# Patient Record
Sex: FEMALE | Race: WHITE | NOT HISPANIC OR LATINO | URBAN - METROPOLITAN AREA
[De-identification: names, ages, dates, MRNs, and addresses within clinical notes are randomized per-mention and may not be internally consistent; named-entity substitution may affect disease eponyms.]

---

## 2024-02-02 ENCOUNTER — LAB (OUTPATIENT)
Dept: LAB | Facility: LAB | Age: 34
End: 2024-02-02
Payer: COMMERCIAL

## 2024-02-02 ENCOUNTER — OFFICE VISIT (OUTPATIENT)
Dept: PRIMARY CARE | Facility: CLINIC | Age: 34
End: 2024-02-02
Payer: COMMERCIAL

## 2024-02-02 VITALS
HEIGHT: 62 IN | SYSTOLIC BLOOD PRESSURE: 110 MMHG | BODY MASS INDEX: 21.53 KG/M2 | WEIGHT: 117 LBS | HEART RATE: 91 BPM | DIASTOLIC BLOOD PRESSURE: 78 MMHG

## 2024-02-02 DIAGNOSIS — K59.09 CHRONIC CONSTIPATION: ICD-10-CM

## 2024-02-02 DIAGNOSIS — Z11.59 ENCOUNTER FOR HEPATITIS C SCREENING TEST FOR LOW RISK PATIENT: ICD-10-CM

## 2024-02-02 DIAGNOSIS — Z00.00 ENCOUNTER FOR PREVENTIVE HEALTH EXAMINATION: ICD-10-CM

## 2024-02-02 DIAGNOSIS — R30.0 DYSURIA: ICD-10-CM

## 2024-02-02 DIAGNOSIS — Z00.00 ENCOUNTER FOR PREVENTIVE HEALTH EXAMINATION: Primary | ICD-10-CM

## 2024-02-02 LAB
25(OH)D3 SERPL-MCNC: 21 NG/ML (ref 30–100)
ALBUMIN SERPL BCP-MCNC: 4.7 G/DL (ref 3.4–5)
ALP SERPL-CCNC: 40 U/L (ref 33–110)
ALT SERPL W P-5'-P-CCNC: 15 U/L (ref 7–45)
ANION GAP SERPL CALC-SCNC: 14 MMOL/L (ref 10–20)
AST SERPL W P-5'-P-CCNC: 18 U/L (ref 9–39)
BILIRUB SERPL-MCNC: 1.9 MG/DL (ref 0–1.2)
BUN SERPL-MCNC: 13 MG/DL (ref 6–23)
CALCIUM SERPL-MCNC: 9.1 MG/DL (ref 8.6–10.3)
CHLORIDE SERPL-SCNC: 103 MMOL/L (ref 98–107)
CHOLEST SERPL-MCNC: 192 MG/DL (ref 0–199)
CHOLESTEROL/HDL RATIO: 1.8
CO2 SERPL-SCNC: 26 MMOL/L (ref 21–32)
CREAT SERPL-MCNC: 0.58 MG/DL (ref 0.5–1.05)
EGFRCR SERPLBLD CKD-EPI 2021: >90 ML/MIN/1.73M*2
ERYTHROCYTE [DISTWIDTH] IN BLOOD BY AUTOMATED COUNT: 11.9 % (ref 11.5–14.5)
GLUCOSE SERPL-MCNC: 68 MG/DL (ref 74–99)
HCT VFR BLD AUTO: 45.1 % (ref 36–46)
HDLC SERPL-MCNC: 108 MG/DL
HGB BLD-MCNC: 14.7 G/DL (ref 12–16)
LDLC SERPL CALC-MCNC: 75 MG/DL
MCH RBC QN AUTO: 32.6 PG (ref 26–34)
MCHC RBC AUTO-ENTMCNC: 32.6 G/DL (ref 32–36)
MCV RBC AUTO: 100 FL (ref 80–100)
NON HDL CHOLESTEROL: 84 MG/DL (ref 0–149)
NRBC BLD-RTO: 0 /100 WBCS (ref 0–0)
PLATELET # BLD AUTO: 300 X10*3/UL (ref 150–450)
POC APPEARANCE, URINE: CLEAR
POC BILIRUBIN, URINE: NEGATIVE
POC BLOOD, URINE: NEGATIVE
POC COLOR, URINE: YELLOW
POC GLUCOSE, URINE: NEGATIVE MG/DL
POC KETONES, URINE: NEGATIVE MG/DL
POC LEUKOCYTES, URINE: NEGATIVE
POC NITRITE,URINE: NEGATIVE
POC PH, URINE: 6 PH
POC PROTEIN, URINE: NEGATIVE MG/DL
POC SPECIFIC GRAVITY, URINE: 1.01
POC UROBILINOGEN, URINE: 1 EU/DL
POTASSIUM SERPL-SCNC: 4.7 MMOL/L (ref 3.5–5.3)
PROT SERPL-MCNC: 7.3 G/DL (ref 6.4–8.2)
RBC # BLD AUTO: 4.51 X10*6/UL (ref 4–5.2)
SODIUM SERPL-SCNC: 138 MMOL/L (ref 136–145)
TRIGL SERPL-MCNC: 43 MG/DL (ref 0–149)
TSH SERPL-ACNC: 1.37 MIU/L (ref 0.44–3.98)
VLDL: 9 MG/DL (ref 0–40)
WBC # BLD AUTO: 8.1 X10*3/UL (ref 4.4–11.3)

## 2024-02-02 PROCEDURE — 80053 COMPREHEN METABOLIC PANEL: CPT

## 2024-02-02 PROCEDURE — 82306 VITAMIN D 25 HYDROXY: CPT

## 2024-02-02 PROCEDURE — 80061 LIPID PANEL: CPT

## 2024-02-02 PROCEDURE — 84443 ASSAY THYROID STIM HORMONE: CPT

## 2024-02-02 PROCEDURE — 86803 HEPATITIS C AB TEST: CPT

## 2024-02-02 PROCEDURE — 83036 HEMOGLOBIN GLYCOSYLATED A1C: CPT

## 2024-02-02 PROCEDURE — 99385 PREV VISIT NEW AGE 18-39: CPT | Performed by: STUDENT IN AN ORGANIZED HEALTH CARE EDUCATION/TRAINING PROGRAM

## 2024-02-02 PROCEDURE — 85027 COMPLETE CBC AUTOMATED: CPT

## 2024-02-02 PROCEDURE — 1036F TOBACCO NON-USER: CPT | Performed by: STUDENT IN AN ORGANIZED HEALTH CARE EDUCATION/TRAINING PROGRAM

## 2024-02-02 PROCEDURE — 81002 URINALYSIS NONAUTO W/O SCOPE: CPT | Performed by: STUDENT IN AN ORGANIZED HEALTH CARE EDUCATION/TRAINING PROGRAM

## 2024-02-02 PROCEDURE — 99204 OFFICE O/P NEW MOD 45 MIN: CPT | Performed by: STUDENT IN AN ORGANIZED HEALTH CARE EDUCATION/TRAINING PROGRAM

## 2024-02-02 PROCEDURE — 36415 COLL VENOUS BLD VENIPUNCTURE: CPT

## 2024-02-02 ASSESSMENT — PATIENT HEALTH QUESTIONNAIRE - PHQ9
1. LITTLE INTEREST OR PLEASURE IN DOING THINGS: NOT AT ALL
SUM OF ALL RESPONSES TO PHQ9 QUESTIONS 1 AND 2: 0
2. FEELING DOWN, DEPRESSED OR HOPELESS: NOT AT ALL

## 2024-02-02 NOTE — ASSESSMENT & PLAN NOTE
In office urinalysis was unremarkable, we will go ahead and send for culture  Increase daily water intake  You can try OTC remedies such as Azo

## 2024-02-02 NOTE — ASSESSMENT & PLAN NOTE
Continue daily Dulcolax  Increase daily water intake, should be getting close to half of your body weight in ounces  Order placed for GI referral

## 2024-02-02 NOTE — PROGRESS NOTES
"Subjective   Patient ID: Laina Elizabeth is a 33 y.o. female who presents for Annual Exam.              HPI  1.  Physical exam.  New patient here to establish care  Due for Pap, requesting gynecology referral  Her mom is 53 and recently diagnosed with ovarian cancer  Due for Tdap, declines  Fasting for lab work    2.  Chronic constipation  Reports 12-year history of constipation, takes daily Dulcolax  Drinks about 3 of the 16 ounce bottles of water daily  Had 1 episode of bright red blood in the toilet bowl after bowel movement about a year ago  Denies fever, abdominal pain or N/V/D  Requesting GI referral    3.  Dysuria  Developed UTI symptoms about 3 weeks ago which resolved after antibiotic therapy prescribed by an online provider  Was symptom-free for several days but has had recurrence of burning with urination for the past week  Denies all other symptoms    Review of Systems  All pertinent positive symptoms are included in the history of present illness.    All other systems have been reviewed and are negative and noncontributory to this patient's current ailments.    Past Medical History:   Diagnosis Date    Encounter for other preprocedural examination 03/05/2018    Pre-operative clearance    Hematuria, unspecified 03/06/2018    Blood in urine    Unspecified jaundice 03/06/2018    Elevated bilirubin     No past surgical history on file.  Social History     Tobacco Use    Smoking status: Never    Smokeless tobacco: Never     No family history on file.  Immunization History   Administered Date(s) Administered    [employer] SARS-CoV-2 Vaccination 05/20/2021     No current outpatient medications  Allergies   Allergen Reactions    Sulfamethoxazole-Trimethoprim Hives       Objective   Vitals:    02/02/24 1041   BP: 110/78   Pulse: 91   Weight: 53.1 kg (117 lb)   Height: 1.575 m (5' 2\")     Body mass index is 21.4 kg/m².    BP Readings from Last 3 Encounters:   02/02/24 110/78      Wt Readings from Last 3 Encounters: "   02/02/24 53.1 kg (117 lb)        No visits with results within 1 Month(s) from this visit.   Latest known visit with results is:   Legacy Encounter on 03/05/2018   Component Date Value    HCG, Urine 03/05/2018 NEGATIVE     Protime 03/05/2018 12.7     INR 03/05/2018 1.1     aPTT 03/05/2018 31     WBC 03/05/2018 6.6     nRBC 03/05/2018 0.0     RBC 03/05/2018 4.22     Hemoglobin 03/05/2018 13.4     Hematocrit 03/05/2018 40.2     MCV 03/05/2018 95     MCHC 03/05/2018 33.3     Platelets 03/05/2018 151     RDW 03/05/2018 11.7     Neutrophils % 03/05/2018 59.6     Immature Granulocytes %,* 03/05/2018 0.2     Lymphocytes % 03/05/2018 32.2     Monocytes % 03/05/2018 6.3     Eosinophils % 03/05/2018 1.2     Basophils % 03/05/2018 0.5     Neutrophils Absolute 03/05/2018 3.92     Lymphocytes Absolute 03/05/2018 2.11     Monocytes Absolute 03/05/2018 0.41     Eosinophils Absolute 03/05/2018 0.08     Basophils Absolute 03/05/2018 0.03     WBC, Urine 03/05/2018 2     RBC, Urine 03/05/2018 1     Squamous Epithelial Cell* 03/05/2018 2     Glucose 03/05/2018 89     Sodium 03/05/2018 140     Potassium 03/05/2018 4.0     Chloride 03/05/2018 108 (H)     Bicarbonate 03/05/2018 25     Anion Gap 03/05/2018 11     Urea Nitrogen 03/05/2018 13     Creatinine 03/05/2018 0.52     GLOMERULAR FILTRATION RA* 03/05/2018 >60     GLOMERULAR FILTRATION RA* 03/05/2018 >60     Calcium 03/05/2018 9.1     Albumin 03/05/2018 4.3     Alkaline Phosphatase 03/05/2018 37     Total Protein 03/05/2018 6.7     AST 03/05/2018 13     Total Bilirubin 03/05/2018 1.8 (H)     ALT (SGPT) 03/05/2018 11     Color, Urine 03/05/2018 YELLOW     Appearance, Urine 03/05/2018 CLEAR     Specific Gravity, Urine 03/05/2018 1.020     pH, Urine 03/05/2018 5.0     Protein, Urine 03/05/2018 NEGATIVE     Glucose, Urine 03/05/2018 NEGATIVE     Blood, Urine 03/05/2018 MODERATE (2+) (A)     Ketones, Urine 03/05/2018 NEGATIVE     Bilirubin, Urine 03/05/2018 NEGATIVE     Urobilinogen,  Urine 03/05/2018 <2.0     Nitrite, Urine 03/05/2018 NEGATIVE     Leukocyte Esterase, Urine 03/05/2018 NEGATIVE      Physical Exam  CONSTITUTIONAL - well nourished, well developed, looks like stated age, in no acute distress, not ill-appearing, and not tired appearing  SKIN - normal skin color and pigmentation, normal skin turgor without rash, lesions, or nodules visualized  HEAD - no trauma, normocephalic  CHEST - clear to auscultation, no wheezing, no crackles and no rales, good effort  CARDIAC - regular rate and regular rhythm, no skipped beats, no murmur  EXTREMITIES - no edema, no deformities  NEUROLOGICAL - normal gait, normal balance, normal motor  PSYCHIATRIC - alert, pleasant and cordial, age-appropriate     Assessment/Plan   Problem List Items Addressed This Visit       Encounter for preventive health examination - Primary     Complete history and physical examination was performed  Order for gynecology referral placed to have Pap updated  Tdap declined  We will notify of test results once available and make treatment recommendations accordingly         Relevant Orders    CBC    Comprehensive Metabolic Panel    Hemoglobin A1C    Lipid Panel    TSH with reflex to Free T4 if abnormal    Vitamin D 25-Hydroxy,Total (for eval of Vitamin D levels)    Referral to Gynecology    Chronic constipation     Continue daily Dulcolax  Increase daily water intake, should be getting close to half of your body weight in ounces  Order placed for GI referral         Relevant Orders    Referral to Gastroenterology    Dysuria     In office urinalysis was unremarkable, we will go ahead and send for culture  Increase daily water intake  You can try OTC remedies such as Azo          Other Visit Diagnoses       Encounter for hepatitis C screening test for low risk patient        Relevant Orders    Hepatitis C Antibody

## 2024-02-02 NOTE — ASSESSMENT & PLAN NOTE
Complete history and physical examination was performed  Order for gynecology referral placed to have Pap updated  Tdap declined  We will notify of test results once available and make treatment recommendations accordingly

## 2024-02-03 LAB
EST. AVERAGE GLUCOSE BLD GHB EST-MCNC: 85 MG/DL
HBA1C MFR BLD: 4.6 %
HCV AB SER QL: NONREACTIVE

## 2024-02-27 ENCOUNTER — OFFICE VISIT (OUTPATIENT)
Dept: OBSTETRICS AND GYNECOLOGY | Facility: CLINIC | Age: 34
End: 2024-02-27
Payer: COMMERCIAL

## 2024-02-27 VITALS
DIASTOLIC BLOOD PRESSURE: 92 MMHG | BODY MASS INDEX: 22.08 KG/M2 | HEIGHT: 62 IN | WEIGHT: 120 LBS | SYSTOLIC BLOOD PRESSURE: 135 MMHG

## 2024-02-27 DIAGNOSIS — Z01.419 PAP TEST, AS PART OF ROUTINE GYNECOLOGICAL EXAMINATION: ICD-10-CM

## 2024-02-27 DIAGNOSIS — Z97.5 IUD (INTRAUTERINE DEVICE) IN PLACE: ICD-10-CM

## 2024-02-27 DIAGNOSIS — Z01.419 WOMEN'S ANNUAL ROUTINE GYNECOLOGICAL EXAMINATION: Primary | ICD-10-CM

## 2024-02-27 PROCEDURE — 99385 PREV VISIT NEW AGE 18-39: CPT | Performed by: OBSTETRICS & GYNECOLOGY

## 2024-02-27 PROCEDURE — 1036F TOBACCO NON-USER: CPT | Performed by: OBSTETRICS & GYNECOLOGY

## 2024-02-27 PROCEDURE — 88175 CYTOPATH C/V AUTO FLUID REDO: CPT | Mod: TC,GCY,PORLAB | Performed by: OBSTETRICS & GYNECOLOGY

## 2024-02-27 PROCEDURE — 87624 HPV HI-RISK TYP POOLED RSLT: CPT | Performed by: OBSTETRICS & GYNECOLOGY

## 2024-02-27 ASSESSMENT — ENCOUNTER SYMPTOMS
PSYCHIATRIC NEGATIVE: 0
DEPRESSION: 0
HEMATOLOGIC/LYMPHATIC NEGATIVE: 0
CONSTITUTIONAL NEGATIVE: 0
GASTROINTESTINAL NEGATIVE: 0
RESPIRATORY NEGATIVE: 0
OCCASIONAL FEELINGS OF UNSTEADINESS: 0
MUSCULOSKELETAL NEGATIVE: 0
CARDIOVASCULAR NEGATIVE: 0
LOSS OF SENSATION IN FEET: 0
NEUROLOGICAL NEGATIVE: 0
ALLERGIC/IMMUNOLOGIC NEGATIVE: 0
EYES NEGATIVE: 0
ENDOCRINE NEGATIVE: 0

## 2024-02-27 ASSESSMENT — PAIN SCALES - GENERAL: PAINLEVEL: 0-NO PAIN

## 2024-02-27 ASSESSMENT — LIFESTYLE VARIABLES
AUDIT-C TOTAL SCORE: 5
HOW MANY STANDARD DRINKS CONTAINING ALCOHOL DO YOU HAVE ON A TYPICAL DAY: 1 OR 2
HOW OFTEN DO YOU HAVE SIX OR MORE DRINKS ON ONE OCCASION: LESS THAN MONTHLY
SKIP TO QUESTIONS 9-10: 0
HOW OFTEN DO YOU HAVE A DRINK CONTAINING ALCOHOL: 4 OR MORE TIMES A WEEK

## 2024-02-27 NOTE — PROGRESS NOTES
"Laina Elizabeth is a 33 y.o. female who is here for a routine exam. PCP = Greta Hadley, DO  For annual exam no complaints.  Sexually active with steady partner.  Mirena IUD in place x 2 years.  Has very minimal spotting at the time of her.  Had an episode of severe pelvic pain approximately week and a half ago.  Self resolved.  No prior history no nausea vomiting fever chills no GI or  complaints.  Has appoint with GI.  Currently essentially asymptomatic    Review of Systems  Except for an episode of pelvic pain week and a half ago    Physical Exam  Constitutional:       Appearance: Normal appearance. She is normal weight.   Genitourinary:      Genitourinary Comments: External genitalia unremarkable  Vagina clear  Cervix close IUD in place uterus normal anteverted  Adnexa unremarkable  Perineum without lesions or swelling    Blood show bilateral implants no masses tenderness or nipple discharge normal appearance   Breasts:     Breasts are soft.     Right: Normal.      Left: Normal.   HENT:      Head: Normocephalic.      Nose: Nose normal.   Eyes:      Pupils: Pupils are equal, round, and reactive to light.   Cardiovascular:      Rate and Rhythm: Normal rate and regular rhythm.   Pulmonary:      Effort: Pulmonary effort is normal.      Breath sounds: Normal breath sounds.   Abdominal:      General: Abdomen is flat. Bowel sounds are normal.      Palpations: Abdomen is soft.   Musculoskeletal:         General: Normal range of motion.      Cervical back: Normal range of motion and neck supple.   Neurological:      General: No focal deficit present.      Mental Status: She is alert.   Skin:     General: Skin is warm and dry.   Psychiatric:         Mood and Affect: Mood normal.         Behavior: Behavior normal.         Thought Content: Thought content normal.         Judgment: Judgment normal.     Objective   BP (!) 135/92   Ht 1.575 m (5' 2\")   Wt 54.4 kg (120 lb)   LMP 02/20/2024   BMI 21.95 kg/m²   OB " History          2    Para   2    Term                AB        Living             SAB        IAB        Ectopic        Multiple        Live Births                      GynHx:  Menarche/Menopause: 10  Periods are regular every 28-30 days, lasting 1 days.  Dysmenorrhea: none.   Cyclic symptoms include none.    Social History     Substance and Sexual Activity   Sexual Activity Not on file     Current contraception: IUD  STIs: none    Substance:   Tobacco Use: Low Risk  (2024)    Patient History     Smoking Tobacco Use: Never     Smokeless Tobacco Use: Never     Passive Exposure: Not on file      Social History     Substance and Sexual Activity   Drug Use Not on file      Social History     Substance and Sexual Activity   Alcohol Use None     Abuse: No  Depression Screen:   Denies depression    Past med hx and past surg hx reviewed     Assessment/Plan      [facility] clinical GYN exam.  Patient sexually active steady partner declining STD testing.  Mirena IUD in place x 2 years.  Doing well.  Does have a 1 day episode of spotting once a month very regular.  Patient notes her mother has recent diagnosis of ovarian cancer.  Patient is over 50.  Currently is having genetic screening done which is still pending.  Patient will call with results to see if she her testing would be indicated.  Will check with oncology and get back to us.  Discussed diet and exercise.  Pap smear obtained return to office in 1 year or as needed

## 2024-04-22 ENCOUNTER — APPOINTMENT (OUTPATIENT)
Dept: GASTROENTEROLOGY | Facility: CLINIC | Age: 34
End: 2024-04-22
Payer: COMMERCIAL